# Patient Record
Sex: MALE | Race: WHITE | NOT HISPANIC OR LATINO
[De-identification: names, ages, dates, MRNs, and addresses within clinical notes are randomized per-mention and may not be internally consistent; named-entity substitution may affect disease eponyms.]

---

## 2018-12-29 ENCOUNTER — TRANSCRIPTION ENCOUNTER (OUTPATIENT)
Age: 63
End: 2018-12-29

## 2020-07-14 ENCOUNTER — TRANSCRIPTION ENCOUNTER (OUTPATIENT)
Age: 65
End: 2020-07-14

## 2020-07-14 ENCOUNTER — APPOINTMENT (OUTPATIENT)
Dept: UROLOGY | Facility: CLINIC | Age: 65
End: 2020-07-14
Payer: MEDICARE

## 2020-07-14 VITALS — TEMPERATURE: 97.2 F

## 2020-07-14 VITALS
BODY MASS INDEX: 29.35 KG/M2 | DIASTOLIC BLOOD PRESSURE: 81 MMHG | HEART RATE: 76 BPM | WEIGHT: 205 LBS | SYSTOLIC BLOOD PRESSURE: 132 MMHG | HEIGHT: 70 IN

## 2020-07-14 PROCEDURE — 99204 OFFICE O/P NEW MOD 45 MIN: CPT

## 2020-07-14 NOTE — PHYSICAL EXAM
[General Appearance - Well Developed] : well developed [Normal Appearance] : normal appearance [General Appearance - Well Nourished] : well nourished [General Appearance - In No Acute Distress] : no acute distress [Well Groomed] : well groomed [Edema] : no peripheral edema [Respiration, Rhythm And Depth] : normal respiratory rhythm and effort [Abdomen Soft] : soft [Exaggerated Use Of Accessory Muscles For Inspiration] : no accessory muscle use [Abdomen Tenderness] : non-tender [Costovertebral Angle Tenderness] : no ~M costovertebral angle tenderness [Urethral Meatus] : meatus normal [Penis Abnormality] : normal circumcised penis [Urinary Bladder Findings] : the bladder was normal on palpation [Epididymis] : the epididymides were normal [Scrotum] : the scrotum was normal [Testes Mass (___cm)] : there were no testicular masses [No Prostate Nodules] : no prostate nodules [Rectal Exam - Rectum] : digital rectal exam was normal [Prostate Size ___ (0-4)] : prostate size [unfilled] (scale: 0-4) [] : no rash [Normal Station and Gait] : the gait and station were normal for the patient's age [No Focal Deficits] : no focal deficits [Oriented To Time, Place, And Person] : oriented to person, place, and time [Affect] : the affect was normal [Mood] : the mood was normal [Not Anxious] : not anxious [No Palpable Adenopathy] : no palpable adenopathy

## 2020-07-14 NOTE — HISTORY OF PRESENT ILLNESS
[Nocturia] : nocturia [Hematuria - Microscopic] : microscopic hematuria [FreeTextEntry1] : 64 yo male presents to establish care for kidney stones. Pt had two episodes of kidney stones 10-15 yrs ago. Pt went to PCP 10 days ago, on UA RBC were seen, CT urogram was ordered, multiple stones on CT. Pt denies dysuria, gross hematuria, abd/flank pain. Pt states frequency with caffeine consumption. Nocturia x1, otherwise voiding well.\par \par Recent psa 0.62.  Recent u/a---> TNTC microscopic blood  (7/2020)\par \par Surgical hx: lumbar lami 2016, bone fusion 20 yrs ago\par Medical hx:asthma, HLD,\par Allergies: PCN- childhood, sulfa- asthma\par Family hx: Father- prostate ca, brother- cardiac \par Meds: singulair, lipitor, asa, vit D, zetia\par \par

## 2020-07-14 NOTE — LETTER BODY
[Dear  ___] : Dear  [unfilled], [Consult Letter:] : I had the pleasure of evaluating your patient, [unfilled]. [( Thank you for referring [unfilled] for consultation for _____ )] : Thank you for referring [unfilled] for consultation for [unfilled] [Please see my note below.] : Please see my note below. [Consult Closing:] : Thank you very much for allowing me to participate in the care of this patient.  If you have any questions, please do not hesitate to contact me. [Sincerely,] : Sincerely, [FreeTextEntry1] : 66 yo male presents to establish care for kidney stones. Pt had two episodes of kidney stones 10-15 yrs ago. Pt went to PCP 10 days ago, on UA RBC were seen, CT urogram was ordered, multiple stones on CT. Pt denies dysuria, gross hematuria, abd/flank pain. Pt states frequency with caffeine consumption. Nocturia x1, otherwise voiding well.\par \par Surgical hx: lumbar lami 2016, bone fusion 20 yrs ago\par Medical hx:asthma, HLD,\par Allergies: PCN- childhood, sulfa- asthma\par Family hx: Father- prostate ca, brother- cardiac \par Meds: singulair, lipitor, asa, vit D, zetia\par \par PE today unremarkable\par \par Microscopic hematuria asymptomatic in 66 yo; with stone history x 2 episodes passed, and with stones on CT urogram. No mass, no hydro. Multiple stones both kidneys, up to 6 mm right, 12 mm left lower. Additional small stones/calcifications, probable MSK based on striations on IVU reconstruct plus stone history.\par \par No surgical intervention required at this time.  We discussed ESWL, URS, PCNL given size of stone on left.\par \par 1. urine cytology and culture today\par 2. 24 hour urine collection x 2 in next few weeks\par 3. RTC for cystoscopy given stones not active, and to r/o any lower tract issue\par 4. Diet modification reviewed at length- increasing fluids (primarily water), citrus is good, and decreasing/moderating salt, animal flesh protein, oxalate containing foods, and moderation of calcium intake (1000 mg/day is USRDA).\par \par I reviewed with the patient the risks of metabolic stone disease given their underlying risk parameters (all of which include large stones, multiple stones, bilateral stones, family history, and young age), and the indications for 24 hour urine metabolic assessment.\par \par We also discussed benefits of regular exercise and weight loss as independent risk reducers for stones.

## 2020-07-14 NOTE — ASSESSMENT
[FreeTextEntry1] : Microscopic hematuria asymptomatic in 64 yo; with stone history x 2 episodes passed, and with stones on CT urogram. No mass, no hydro. Multiple stones both kidneys, up to 6 mm right, 12 mm left lower. Additional small stones/calcifications, probable MSK based on striations on IVU reconstruct plus stone history.\par \par No surgical intervention required at this time.  We discussed ESWL, URS, PCNL given size of stone on left.\par \par 1. urine cytology and culture today\par 2. 24 hour urine collection x 2 in next few weeks\par 3. RTC for cystoscopy given stones not active, and to r/o any lower tract issue\par 4. Diet modification reviewed at length- increasing fluids (primarily water), citrus is good, and decreasing/moderating salt, animal flesh protein, oxalate containing foods, and moderation of calcium intake (1000 mg/day is USRDA).\par \par I reviewed with the patient the risks of metabolic stone disease given their underlying risk parameters (all of which include large stones, multiple stones, bilateral stones, family history, and young age), and the indications for 24 hour urine metabolic assessment.\par \par We also discussed benefits of regular exercise and weight loss as independent risk reducers for stones.

## 2020-07-15 LAB
BACTERIA UR CULT: NORMAL
URINE CYTOLOGY: NORMAL

## 2020-08-07 ENCOUNTER — APPOINTMENT (OUTPATIENT)
Dept: UROLOGY | Facility: CLINIC | Age: 65
End: 2020-08-07

## 2020-08-10 ENCOUNTER — TRANSCRIPTION ENCOUNTER (OUTPATIENT)
Age: 65
End: 2020-08-10

## 2020-08-10 RX ORDER — DIAZEPAM 10 MG/1
10 TABLET ORAL ONCE
Qty: 1 | Refills: 0 | Status: ACTIVE | COMMUNITY
Start: 2020-08-10 | End: 1900-01-01

## 2020-08-11 ENCOUNTER — TRANSCRIPTION ENCOUNTER (OUTPATIENT)
Age: 65
End: 2020-08-11

## 2020-08-12 ENCOUNTER — APPOINTMENT (OUTPATIENT)
Dept: UROLOGY | Facility: CLINIC | Age: 65
End: 2020-08-12
Payer: MEDICARE

## 2020-08-12 ENCOUNTER — OUTPATIENT (OUTPATIENT)
Dept: OUTPATIENT SERVICES | Facility: HOSPITAL | Age: 65
LOS: 1 days | End: 2020-08-12
Payer: MEDICARE

## 2020-08-12 VITALS
SYSTOLIC BLOOD PRESSURE: 135 MMHG | RESPIRATION RATE: 16 BRPM | HEART RATE: 81 BPM | TEMPERATURE: 97.6 F | DIASTOLIC BLOOD PRESSURE: 80 MMHG

## 2020-08-12 VITALS — TEMPERATURE: 97.6 F

## 2020-08-12 DIAGNOSIS — R35.0 FREQUENCY OF MICTURITION: ICD-10-CM

## 2020-08-12 PROCEDURE — 52000 CYSTOURETHROSCOPY: CPT

## 2020-08-12 PROCEDURE — 99214 OFFICE O/P EST MOD 30 MIN: CPT | Mod: 25

## 2020-08-12 NOTE — PHYSICAL EXAM
[General Appearance - Well Developed] : well developed [Abdomen Soft] : soft [Skin Color & Pigmentation] : normal skin color and pigmentation [] : no respiratory distress [Heart Rate And Rhythm] : Heart rate and rhythm were normal [Oriented To Time, Place, And Person] : oriented to person, place, and time [Normal Station and Gait] : the gait and station were normal for the patient's age [No Focal Deficits] : no focal deficits

## 2020-08-13 NOTE — HISTORY OF PRESENT ILLNESS
[None] : None [FreeTextEntry1] : 65 yr old male presents to follow up with kidney stones and complete w/u for microhematuria. Pt denies frequency/urgency, dysuria, hematuria, or abd/ flank pain.

## 2020-08-13 NOTE — ASSESSMENT
[FreeTextEntry1] : Cysto today  see notes:\par \par This includes increasing fluid intake to produce 2 to 2.5 liters of urine per day (approx 3 liters intake), and should be primarily water.  \par \par Calcium intake should be approximately 1000 mg per day.  \par \par Oxalate intake should be reduced- most common sources in diet which have very high levels include peanuts/nuts, tea, coffee, chocolate, spinach, beets, rhubarb, swiss chard.  I've also given/directed to a list with other high oxalate.\par   \par Animal flesh protein should be controlled- approx 4 to 6 ounces per day, with some vegetarian days included in the week.  Studies have shown that vegetarians have half the risk of stones of people who eat only 4 ounces per day of meat or fish of any kind (beef, chicken, fish, shellfish, pork, etc), indicating that a vegetarian lifestyle can decrease future stone risks.\par \par Finally, salt intake should be reduced as high levels in the diet will increase urinary calcium.  <2400 mg per day on a low sodium diet is strongly recommended.\par \par Citrate is a benefit; remi and limes with most citrate and least sugar- recommend 'a lemon or lime a day'; easiest with concentrate, mixed into water or other beverages.\par \par Lifestyle changes: regular exercise and weight loss both are independent risk-reducers for stones.\par \par In addition, for further details online, go to www.[employer].com <http://www.[employer].com> ---> in the lower left column there is a link for "diet resources", and review or download.\par \par 24 hr urine- good volume, good sodium, high oxalate on one of the days. High animal proteins, high UA on 8/3\par \par Plan\par 1) RTC in 6 months with Renal US \par 2) diet mod

## 2020-08-17 DIAGNOSIS — Q61.5 MEDULLARY CYSTIC KIDNEY: ICD-10-CM

## 2020-08-17 DIAGNOSIS — N20.0 CALCULUS OF KIDNEY: ICD-10-CM

## 2020-08-17 DIAGNOSIS — R31.29 OTHER MICROSCOPIC HEMATURIA: ICD-10-CM

## 2020-11-13 ENCOUNTER — TRANSCRIPTION ENCOUNTER (OUTPATIENT)
Age: 65
End: 2020-11-13

## 2021-02-10 ENCOUNTER — APPOINTMENT (OUTPATIENT)
Dept: UROLOGY | Facility: CLINIC | Age: 66
End: 2021-02-10
Payer: MEDICARE

## 2021-02-10 ENCOUNTER — OUTPATIENT (OUTPATIENT)
Dept: OUTPATIENT SERVICES | Facility: HOSPITAL | Age: 66
LOS: 1 days | End: 2021-02-10
Payer: MEDICARE

## 2021-02-10 DIAGNOSIS — Q61.5 MEDULLARY CYSTIC KIDNEY: ICD-10-CM

## 2021-02-10 DIAGNOSIS — N20.0 CALCULUS OF KIDNEY: ICD-10-CM

## 2021-02-10 DIAGNOSIS — R31.29 OTHER MICROSCOPIC HEMATURIA: ICD-10-CM

## 2021-02-10 DIAGNOSIS — R35.0 FREQUENCY OF MICTURITION: ICD-10-CM

## 2021-02-10 PROCEDURE — 76775 US EXAM ABDO BACK WALL LIM: CPT | Mod: 26

## 2021-02-10 PROCEDURE — 99214 OFFICE O/P EST MOD 30 MIN: CPT | Mod: 25

## 2021-02-10 PROCEDURE — 76775 US EXAM ABDO BACK WALL LIM: CPT

## 2021-02-10 RX ORDER — POTASSIUM CITRATE 10 MEQ/1
10 MEQ TABLET, EXTENDED RELEASE ORAL
Qty: 180 | Refills: 3 | Status: ACTIVE | COMMUNITY
Start: 2021-02-10 | End: 1900-01-01

## 2021-02-10 NOTE — HISTORY OF PRESENT ILLNESS
[None] : None [FreeTextEntry1] : 65 yr old male presents to follow up with kidney stones, microhematuria, s/p w/u 2020. Pt denies dysuria, hematuria, or abd/ flank pain. \par \par Likely MSK. No new sig issues to report.   [Dysuria] : no dysuria [Hematuria - Gross] : no gross hematuria

## 2021-02-10 NOTE — ASSESSMENT
[FreeTextEntry1] : Renal US reviewed- no sig change from prior stone burden as visualized on CT urogram- approx 8 mm right mid-kidney with shadowing. Several stones 3 to 5 mm left kidney with twinkle, suggestion of shadowing on largest. No hydro. No solid renal mass.\par \par We had discussion about MSK, stone prevention, and reviewed earlier 24 hour urine collection.\par \par Benefits for potassium citrate for stone prevention and for reduction of risks of stone episodes in setting of clinical MSK.  Pt agreeable- \par \par 1. pot citrate 10 meq bid (either tab, or via litholyte\par 2. RTC 6 months with renal US for f/u

## 2021-02-12 ENCOUNTER — TRANSCRIPTION ENCOUNTER (OUTPATIENT)
Age: 66
End: 2021-02-12

## 2021-02-16 DIAGNOSIS — Q61.5 MEDULLARY CYSTIC KIDNEY: ICD-10-CM

## 2021-02-16 DIAGNOSIS — N20.0 CALCULUS OF KIDNEY: ICD-10-CM

## 2021-02-16 DIAGNOSIS — R31.29 OTHER MICROSCOPIC HEMATURIA: ICD-10-CM

## 2021-02-24 ENCOUNTER — NON-APPOINTMENT (OUTPATIENT)
Age: 66
End: 2021-02-24

## 2021-02-25 ENCOUNTER — TRANSCRIPTION ENCOUNTER (OUTPATIENT)
Age: 66
End: 2021-02-25

## 2021-02-25 ENCOUNTER — NON-APPOINTMENT (OUTPATIENT)
Age: 66
End: 2021-02-25

## 2021-02-26 ENCOUNTER — NON-APPOINTMENT (OUTPATIENT)
Age: 66
End: 2021-02-26

## 2021-02-26 ENCOUNTER — INPATIENT (INPATIENT)
Facility: HOSPITAL | Age: 66
LOS: 0 days | Discharge: HOME | DRG: 694 | End: 2021-02-27
Attending: UROLOGY | Admitting: UROLOGY
Payer: COMMERCIAL

## 2021-02-26 ENCOUNTER — TRANSCRIPTION ENCOUNTER (OUTPATIENT)
Age: 66
End: 2021-02-26

## 2021-02-26 VITALS
SYSTOLIC BLOOD PRESSURE: 188 MMHG | RESPIRATION RATE: 18 BRPM | HEIGHT: 71 IN | WEIGHT: 199.96 LBS | TEMPERATURE: 99 F | HEART RATE: 112 BPM | OXYGEN SATURATION: 96 % | DIASTOLIC BLOOD PRESSURE: 91 MMHG

## 2021-02-26 LAB
ALBUMIN SERPL ELPH-MCNC: 4.2 G/DL — SIGNIFICANT CHANGE UP (ref 3.3–5)
ALP SERPL-CCNC: 37 U/L — LOW (ref 40–120)
ALT FLD-CCNC: 27 U/L — SIGNIFICANT CHANGE UP (ref 10–45)
ANION GAP SERPL CALC-SCNC: 12 MMOL/L — SIGNIFICANT CHANGE UP (ref 5–17)
APPEARANCE UR: CLEAR — SIGNIFICANT CHANGE UP
AST SERPL-CCNC: 24 U/L — SIGNIFICANT CHANGE UP (ref 10–40)
BACTERIA # UR AUTO: NEGATIVE — SIGNIFICANT CHANGE UP
BASOPHILS # BLD AUTO: 0.03 K/UL — SIGNIFICANT CHANGE UP (ref 0–0.2)
BASOPHILS NFR BLD AUTO: 0.3 % — SIGNIFICANT CHANGE UP (ref 0–2)
BILIRUB SERPL-MCNC: 1 MG/DL — SIGNIFICANT CHANGE UP (ref 0.2–1.2)
BILIRUB UR-MCNC: NEGATIVE — SIGNIFICANT CHANGE UP
BUN SERPL-MCNC: 20 MG/DL — SIGNIFICANT CHANGE UP (ref 7–23)
CALCIUM SERPL-MCNC: 9 MG/DL — SIGNIFICANT CHANGE UP (ref 8.4–10.5)
CHLORIDE SERPL-SCNC: 103 MMOL/L — SIGNIFICANT CHANGE UP (ref 96–108)
CO2 SERPL-SCNC: 25 MMOL/L — SIGNIFICANT CHANGE UP (ref 22–31)
COLOR SPEC: YELLOW — SIGNIFICANT CHANGE UP
CREAT SERPL-MCNC: 1.26 MG/DL — SIGNIFICANT CHANGE UP (ref 0.5–1.3)
DIFF PNL FLD: ABNORMAL
EOSINOPHIL # BLD AUTO: 0.03 K/UL — SIGNIFICANT CHANGE UP (ref 0–0.5)
EOSINOPHIL NFR BLD AUTO: 0.3 % — SIGNIFICANT CHANGE UP (ref 0–6)
EPI CELLS # UR: 1 /HPF — SIGNIFICANT CHANGE UP
GLUCOSE SERPL-MCNC: 108 MG/DL — HIGH (ref 70–99)
GLUCOSE UR QL: NEGATIVE — SIGNIFICANT CHANGE UP
HCT VFR BLD CALC: 43.2 % — SIGNIFICANT CHANGE UP (ref 39–50)
HGB BLD-MCNC: 14 G/DL — SIGNIFICANT CHANGE UP (ref 13–17)
HYALINE CASTS # UR AUTO: 0 /LPF — SIGNIFICANT CHANGE UP (ref 0–7)
IMM GRANULOCYTES NFR BLD AUTO: 0.5 % — SIGNIFICANT CHANGE UP (ref 0–1.5)
KETONES UR-MCNC: ABNORMAL
LEUKOCYTE ESTERASE UR-ACNC: NEGATIVE — SIGNIFICANT CHANGE UP
LYMPHOCYTES # BLD AUTO: 1.27 K/UL — SIGNIFICANT CHANGE UP (ref 1–3.3)
LYMPHOCYTES # BLD AUTO: 12.7 % — LOW (ref 13–44)
MCHC RBC-ENTMCNC: 30.3 PG — SIGNIFICANT CHANGE UP (ref 27–34)
MCHC RBC-ENTMCNC: 32.4 GM/DL — SIGNIFICANT CHANGE UP (ref 32–36)
MCV RBC AUTO: 93.5 FL — SIGNIFICANT CHANGE UP (ref 80–100)
MONOCYTES # BLD AUTO: 0.86 K/UL — SIGNIFICANT CHANGE UP (ref 0–0.9)
MONOCYTES NFR BLD AUTO: 8.6 % — SIGNIFICANT CHANGE UP (ref 2–14)
NEUTROPHILS # BLD AUTO: 7.74 K/UL — HIGH (ref 1.8–7.4)
NEUTROPHILS NFR BLD AUTO: 77.6 % — HIGH (ref 43–77)
NITRITE UR-MCNC: NEGATIVE — SIGNIFICANT CHANGE UP
NRBC # BLD: 0 /100 WBCS — SIGNIFICANT CHANGE UP (ref 0–0)
PH UR: 6 — SIGNIFICANT CHANGE UP (ref 5–8)
PLATELET # BLD AUTO: 164 K/UL — SIGNIFICANT CHANGE UP (ref 150–400)
POTASSIUM SERPL-MCNC: 4.3 MMOL/L — SIGNIFICANT CHANGE UP (ref 3.5–5.3)
POTASSIUM SERPL-SCNC: 4.3 MMOL/L — SIGNIFICANT CHANGE UP (ref 3.5–5.3)
PROT SERPL-MCNC: 7 G/DL — SIGNIFICANT CHANGE UP (ref 6–8.3)
PROT UR-MCNC: SIGNIFICANT CHANGE UP
RBC # BLD: 4.62 M/UL — SIGNIFICANT CHANGE UP (ref 4.2–5.8)
RBC # FLD: 12.2 % — SIGNIFICANT CHANGE UP (ref 10.3–14.5)
RBC CASTS # UR COMP ASSIST: 1 /HPF — SIGNIFICANT CHANGE UP (ref 0–4)
SARS-COV-2 RNA SPEC QL NAA+PROBE: SIGNIFICANT CHANGE UP
SODIUM SERPL-SCNC: 140 MMOL/L — SIGNIFICANT CHANGE UP (ref 135–145)
SP GR SPEC: 1.02 — SIGNIFICANT CHANGE UP (ref 1.01–1.02)
UROBILINOGEN FLD QL: NEGATIVE — SIGNIFICANT CHANGE UP
WBC # BLD: 9.98 K/UL — SIGNIFICANT CHANGE UP (ref 3.8–10.5)
WBC # FLD AUTO: 9.98 K/UL — SIGNIFICANT CHANGE UP (ref 3.8–10.5)
WBC UR QL: 3 /HPF — SIGNIFICANT CHANGE UP (ref 0–5)

## 2021-02-26 RX ORDER — ATORVASTATIN CALCIUM 80 MG/1
40 TABLET, FILM COATED ORAL AT BEDTIME
Refills: 0 | Status: DISCONTINUED | OUTPATIENT
Start: 2021-02-26 | End: 2021-02-26

## 2021-02-26 RX ORDER — ACETAMINOPHEN 500 MG
975 TABLET ORAL EVERY 6 HOURS
Refills: 0 | Status: DISCONTINUED | OUTPATIENT
Start: 2021-02-26 | End: 2021-02-27

## 2021-02-26 RX ORDER — SENNA PLUS 8.6 MG/1
2 TABLET ORAL AT BEDTIME
Refills: 0 | Status: DISCONTINUED | OUTPATIENT
Start: 2021-02-26 | End: 2021-02-27

## 2021-02-26 RX ORDER — KETOROLAC TROMETHAMINE 30 MG/ML
15 SYRINGE (ML) INJECTION EVERY 8 HOURS
Refills: 0 | Status: DISCONTINUED | OUTPATIENT
Start: 2021-02-26 | End: 2021-02-27

## 2021-02-26 RX ORDER — MORPHINE SULFATE 50 MG/1
4 CAPSULE, EXTENDED RELEASE ORAL EVERY 6 HOURS
Refills: 0 | Status: DISCONTINUED | OUTPATIENT
Start: 2021-02-26 | End: 2021-02-27

## 2021-02-26 RX ORDER — HYDROMORPHONE HYDROCHLORIDE 2 MG/ML
1 INJECTION INTRAMUSCULAR; INTRAVENOUS; SUBCUTANEOUS EVERY 8 HOURS
Refills: 0 | Status: DISCONTINUED | OUTPATIENT
Start: 2021-02-26 | End: 2021-02-27

## 2021-02-26 RX ORDER — ONDANSETRON 8 MG/1
4 TABLET, FILM COATED ORAL EVERY 6 HOURS
Refills: 0 | Status: DISCONTINUED | OUTPATIENT
Start: 2021-02-26 | End: 2021-02-27

## 2021-02-26 RX ORDER — OXYCODONE HYDROCHLORIDE 5 MG/1
5 TABLET ORAL EVERY 6 HOURS
Refills: 0 | Status: DISCONTINUED | OUTPATIENT
Start: 2021-02-26 | End: 2021-02-27

## 2021-02-26 RX ORDER — ONDANSETRON 8 MG/1
4 TABLET, FILM COATED ORAL ONCE
Refills: 0 | Status: COMPLETED | OUTPATIENT
Start: 2021-02-26 | End: 2021-02-26

## 2021-02-26 RX ORDER — SODIUM CHLORIDE 9 MG/ML
3 INJECTION INTRAMUSCULAR; INTRAVENOUS; SUBCUTANEOUS EVERY 8 HOURS
Refills: 0 | Status: DISCONTINUED | OUTPATIENT
Start: 2021-02-26 | End: 2021-02-27

## 2021-02-26 RX ORDER — SODIUM CHLORIDE 9 MG/ML
1000 INJECTION INTRAMUSCULAR; INTRAVENOUS; SUBCUTANEOUS ONCE
Refills: 0 | Status: COMPLETED | OUTPATIENT
Start: 2021-02-26 | End: 2021-02-26

## 2021-02-26 RX ORDER — MULTIVIT WITH MIN/MFOLATE/K2 340-15/3 G
1 POWDER (GRAM) ORAL ONCE
Refills: 0 | Status: COMPLETED | OUTPATIENT
Start: 2021-02-26 | End: 2021-02-26

## 2021-02-26 RX ORDER — POLYETHYLENE GLYCOL 3350 17 G/17G
17 POWDER, FOR SOLUTION ORAL DAILY
Refills: 0 | Status: DISCONTINUED | OUTPATIENT
Start: 2021-02-26 | End: 2021-02-27

## 2021-02-26 RX ORDER — KETOROLAC TROMETHAMINE 30 MG/ML
15 SYRINGE (ML) INJECTION ONCE
Refills: 0 | Status: DISCONTINUED | OUTPATIENT
Start: 2021-02-26 | End: 2021-02-26

## 2021-02-26 RX ORDER — SODIUM CHLORIDE 9 MG/ML
1000 INJECTION INTRAMUSCULAR; INTRAVENOUS; SUBCUTANEOUS
Refills: 0 | Status: DISCONTINUED | OUTPATIENT
Start: 2021-02-26 | End: 2021-02-27

## 2021-02-26 RX ORDER — TAMSULOSIN HYDROCHLORIDE 0.4 MG/1
0.4 CAPSULE ORAL AT BEDTIME
Refills: 0 | Status: DISCONTINUED | OUTPATIENT
Start: 2021-02-26 | End: 2021-02-27

## 2021-02-26 RX ADMIN — SODIUM CHLORIDE 1000 MILLILITER(S): 9 INJECTION INTRAMUSCULAR; INTRAVENOUS; SUBCUTANEOUS at 13:03

## 2021-02-26 RX ADMIN — SODIUM CHLORIDE 125 MILLILITER(S): 9 INJECTION INTRAMUSCULAR; INTRAVENOUS; SUBCUTANEOUS at 19:25

## 2021-02-26 RX ADMIN — POLYETHYLENE GLYCOL 3350 17 GRAM(S): 17 POWDER, FOR SOLUTION ORAL at 18:10

## 2021-02-26 RX ADMIN — ONDANSETRON 4 MILLIGRAM(S): 8 TABLET, FILM COATED ORAL at 13:03

## 2021-02-26 RX ADMIN — SENNA PLUS 2 TABLET(S): 8.6 TABLET ORAL at 19:51

## 2021-02-26 RX ADMIN — Medication 1 BOTTLE: at 15:31

## 2021-02-26 RX ADMIN — Medication 15 MILLIGRAM(S): at 13:03

## 2021-02-26 RX ADMIN — SODIUM CHLORIDE 3 MILLILITER(S): 9 INJECTION INTRAMUSCULAR; INTRAVENOUS; SUBCUTANEOUS at 22:00

## 2021-02-26 RX ADMIN — Medication 15 MILLIGRAM(S): at 21:49

## 2021-02-26 RX ADMIN — TAMSULOSIN HYDROCHLORIDE 0.4 MILLIGRAM(S): 0.4 CAPSULE ORAL at 19:51

## 2021-02-26 NOTE — ED CDU PROVIDER DISPOSITION NOTE - CARE PROVIDER_API CALL
Hoenig, David M (MD)  Urology  [facility]- Dept. of Urology, [address] [place] [address]  Phone: [phone]  Fax: [phone]  Follow Up Time: 1-3 Days

## 2021-02-26 NOTE — ED CDU PROVIDER DISPOSITION NOTE - CLINICAL COURSE
64yo male with PMHx of HLD, Kidney stonex2 (last one was 7years ago) presents to ED with right flank pain and diagnosed right mid ureter stone (5mm). Pt stated he felt sudden sharp right flank pain 2days ago and evaluated in ED at [facility]. He is on Percocet/ Ibuprofen but noticed worsening pain today. He spoked to his urologist Dr. Hoenig and recommended to go to ED. Denies fever, cough, congestion or sore throat, CP, SOB, urinary problems. Denies sensory changes or weakness to extremities.  In ED, pt initially tachycardic, now improved. Blood work unremarkable. UA + mod blood. Pt seen by urology who reviewed outside imaging, recommending CDU for pain control, hydration, strain urine, flomax. Repeat labs in AM...... 64yo male with PMHx of HLD, Kidney stonex2 (last one was 7years ago) presents to ED with right flank pain and diagnosed right mid ureter stone (5mm). Pt stated he felt sudden sharp right flank pain 2days ago and evaluated in ED at [facility]. He is on Percocet/ Ibuprofen but noticed worsening pain today. He spoked to his urologist Dr. Hoenig and recommended to go to ED. Denies fever, cough, congestion or sore throat, CP, SOB, urinary problems. Denies sensory changes or weakness to extremities.  In ED, pt initially tachycardic, now improved. Blood work unremarkable. UA + mod blood. Pt seen by urology who reviewed outside imaging, recommending CDU for pain control, hydration, strain urine, flomax.  Per Urology, pt can do PO challenge and be discharge with PO meds with follow up with Dr. Hoenig in office this week. Pt moaning in pain after speaking to Urology 8/10 pain, unable to find a comfortable position. Urology repaged- pt to be admitted for further management. NPO. Case discussed with Dr. Joy.

## 2021-02-26 NOTE — ED CDU PROVIDER DISPOSITION NOTE - ATTENDING CONTRIBUTION TO CARE
Attending MD Joy:   I personally have seen and examined this patient.  Physician assistant note reviewed and agree on plan of care and except where noted.  See below for details.     Seen in [place] 61    65M with PMH/PSH including HLD, nephrolithiasis (last one 7 yrs ago) presents to the ED with R flank pain and known nephrolithiasis.  Reports persistent R flank pain.  Denies fevers.  Denies chest pain, shortness of breath, palpitations. Denies dysuria, hematuria, change in urinary habits including frequency, urgency.  Denies numbness, weakness or tingling in extremities. Denies rash. Reports sexually active, one female partner, denies STIs.  Denies testicular pain, penile discharge or lesions.  A ten (10) point review of systems was negative other than as stated in the HPI or elsewhere in the chart.     Exam:   General: NAD  HENT: head NCAT, airway patent with moist mucous membranes  Eyes: PERRL  Lungs: lungs CTAB with good inspiratory effort, no wheezing, no rhonchi, no rales  Cardiac: +S1S2, no m/r/g  GI: abdomen soft with +BS, NT, ND  : R CVAT  MSK: FROM at neck, no calf tenderness, swelling, erythema or warmth  Neuro: moving all extremities spontaneously, sensory grossly intact, no gross neuro deficits  Psych: normal mood and affect     A/P: 65M with R nephrolithiasis, pain poorly controlled, Cr, uro will admit patient.

## 2021-02-26 NOTE — ED ADULT NURSE NOTE - OBJECTIVE STATEMENT
64 y/o male PMH of HLD, Kidney stones with last stone 7 years ago presenting to ED c/o worsening, uncontrolled R. sided flank pain with a dx r. ureter stone  a few days ago while seen in another ED. pt states he was prescribed percocet for the pain but called his urologist today c/o the pain being unrelieved by meds and was directed to come to the ED for pain management and further eval. pt denies any noted blood in urine, no painful urination, fever, chills, weakness, dizziness, abd pain, diarrhea, known sick contacts or recent travel. verbalizes feeling nauseas that is associated with the pain- no vomiting. VS stable. labs and line obtained, results pending. safety maintained, call bell at the bedside and within reach.

## 2021-02-26 NOTE — ED PROVIDER NOTE - PHYSICAL EXAMINATION
NAD, Tachycardia, Hypertensive, Afebrile, Lungs clear. ABD soft, non tender. No CVA tender. Neuro- intact. No peripheral edema.

## 2021-02-26 NOTE — ED ADULT NURSE REASSESSMENT NOTE - NS ED NURSE REASSESS COMMENT FT1
Received pt from GEOVANNA Sorto , received pt alert and responsive, oriented x4, denies any respiratory distress, SOB, or difficulty breathing. Pt transferred to CDU for R flank pain. Pt resting comfortably at this time with no complaints. IV in place, patent and free of signs of infiltration, V/S stable, pt afebrile. Pt educated on unit and unit rules, instructed patient to notify RN of any needed assistance, Pt verbalizes understanding, Call bell placed within reach. Safety maintained. Will continue to monitor. Pending AM labs. Received pt from GEOVANNA Sorto , received pt alert and responsive, oriented x4, denies any respiratory distress, SOB, or difficulty breathing. Pt transferred to CDU for R flank pain. Pt denies flank pain at this time but has c/o abdominal "bloating", constipation. Placed on IVF tolerating well. IV in place, patent and free of signs of infiltration, V/S stable, pt afebrile. Pt educated on unit and unit rules, instructed patient to notify RN of any needed assistance, Pt verbalizes understanding, Call bell placed within reach. Safety maintained. Will continue to monitor. Pending AM labs.

## 2021-02-26 NOTE — ED CDU PROVIDER INITIAL DAY NOTE - DETAILS
66yo male pt with PMHx of HLD, Kidney stonex2 (last one was 7years ago) presents to ED with right flank pain and diagnosed right mid ureter stone (5mm).  *KIDNEY STONE  -pain control  -IVF  -anti-emetics  -appreciate urology consult  -discussed case with Dr. Joy

## 2021-02-26 NOTE — CHART NOTE - NSCHARTNOTEFT_GEN_A_CORE
Pt seen and examined at bedside.  Follow up pain control, labs.  No CT necessary as he brought disc.

## 2021-02-26 NOTE — CONSULT NOTE ADULT - ASSESSMENT
R renal colic secondary to 5mm R mid ureteral stone  - Pain control, i.e. Toradol, Percocet  - Send urine culture  - Strain urine  - Hydration  - Flomax 0.4mg QHS  - Will observe in CDU overnight  - D/w Dr. Hoenig

## 2021-02-26 NOTE — ED ADULT NURSE REASSESSMENT NOTE - GENERAL PATIENT STATE
comfortable appearance/cooperative/resting/sleeping/smiling/interactive comfortable appearance/cooperative/improvement verbalized/resting/sleeping/smiling/interactive

## 2021-02-26 NOTE — ED ADULT NURSE REASSESSMENT NOTE - NS ED NURSE REASSESS COMMENT FT1
Pt report received from Harika AUSTIN. Pt transferred to cdu Bed 2 for pain control due to kidney stone. Pt a/ox3 denies respiratory distress, sob, dyspnea, C/P, palpitations, n/v/d at this time. Pt states symptoms have improved.  VSS, afebrile, IV clean/dry/intact. Pt aware of plan of care, call bell within reach ,safety maintained. Will monitor and assess.

## 2021-02-26 NOTE — ED CDU PROVIDER DISPOSITION NOTE - CARE PROVIDERS DIRECT ADDRESSES
[email or web address]

## 2021-02-26 NOTE — ED CDU PROVIDER INITIAL DAY NOTE - OBJECTIVE STATEMENT
64yo male pt with PMHx of HLD, Kidney stonex2 (last one was 7years ago) presents to ED with right flank pain and diagnosed right mid ureter stone (5mm). Pt stated he felt sudden sharp right flank pain 2days ago and evaluated in ED at [facility]. He is on Percocet/ Ibuprofen but noticed worsening pain today. He spoked to his urologist Dr. Hoenig and recommended to go to ED. He noticed radiating pain to lower abdomen, nausea and chills with severe pain and the pain is mild after taking Percocet at 11am (took Flomax last night and Ibuprofen at 6am). Denies fever, cough, congestion or sore throat. Denies CP/SOB. Denies urinary problems. Denies sensory changes or weakness to extremities..

## 2021-02-26 NOTE — ED CDU PROVIDER INITIAL DAY NOTE - ATTENDING CONTRIBUTION TO CARE
Attending MD Joy:   I personally have seen and examined this patient.  Physician assistant note reviewed and agree on plan of care and except where noted.  See below for details.     Seen in [place] 61    65M with PMH/PSH including HLD, nephrolithiasis (last one 7 yrs ago) presents to the ED with R flank pain and known nephrolithiasis.  Reports that about 48 hrs ago developed pain.  Went to [facility] ([place]) reports had CT scan which showed 5mm R mid ureter stone, reports pain control and discharge with Percocet.  Reports called Dr. Hoenig's office who recommended ED today.  Reports pain in the R flank extending anteriorly, associated with nausea and chills when pain is severe.  Reports also noted elevated BP at time of arrival to [facility].  Reports took Percocet at 9:45am, reports took Flomax and Ibuprofen last 4am.  Denies fevers.  Denies chest pain, shortness of breath, palpitations. Denies dysuria, hematuria, change in urinary habits including frequency, urgency.  Denies numbness, weakness or tingling in extremities. Denies rash. Reports sexually active, one female partner, denies STIs.  Denies testicular pain, penile discharge or lesions.  A ten (10) point review of systems was negative other than as stated in the HPI or elsewhere in the chart.     Exam:   General: NAD  HENT: head NCAT, airway patent with moist mucous membranes  Eyes: PERRL  Lungs: lungs CTAB with good inspiratory effort, no wheezing, no rhonchi, no rales  Cardiac: +S1S2, no m/r/g  GI: abdomen soft with +BS, NT, ND  : R CVAT  MSK: FROM at neck, no calf tenderness, swelling, erythema or warmth  Neuro: moving all extremities spontaneously, sensory grossly intact, no gross neuro deficits  Psych: normal mood and affect     A/P: 65M with R nephrolithiasis, will give pain control, CDU for pain control, IVFs, frequent reassessments, uro final recs

## 2021-02-26 NOTE — ED CDU PROVIDER DISPOSITION NOTE - NSFOLLOWUPINSTRUCTIONS_ED_ALL_ED_FT
Rest. Stay well hydrated. Continue to strain your urine.   Take Flomax 0.4mg daily.   Take Ibuprofen 600mg every 8 hrs for pain as needed. Take with food.   May alternate with Acetminophen (Tylenol) 650mg every 6 hours for pain as needed.     Take zofran 4mg tabs once every 8 hours as needed for nausea.   Take oxycodone 5mg every 8 hours as needed for severe pain *** caution SIDE EFFECT of drowsiness - DO NOT drive or drink alcohol while taking this medication.     Follow up with your Urologist, Dr. Hoenig, within 24-48 hours.  Please also follow up with your primary care provider. Bring copy of printed results with you.     Return to ER for any worsening pain, fever/chills, difficulty urinating, vomiting, unable to eat/drink, or any other concerning symptoms.

## 2021-02-26 NOTE — ED CDU PROVIDER INITIAL DAY NOTE - PROGRESS NOTE DETAILS
Pt seen and evaluated at bedside, resting comfortably, NAD. Tolerated dinner with no issues. C/o persistent constipation but recently took miralax and mag citrate. Denies any pain at this time. Most recent VSS. Pt with return of pain, reports 5/10. No n/v, no f/c. Most recent VSS. Pt given toradol, will re-assess.

## 2021-02-26 NOTE — ED PROVIDER NOTE - ATTENDING CONTRIBUTION TO CARE
Attending MD Joy: I personally have seen and examined this patient.  NP note reviewed and agree on plan of care and except where noted.  See below for details.     Seen in [place] 61    65M with PMH/PSH including HLD, nephrolithiasis (last one 7 yrs ago) presents to the ED with R flank pain and known nephrolithiasis.  Reports that about 48 hrs ago developed pain.  Went to [facility] ([place]) reports had CT scan which showed 5mm R mid ureter stone, reports pain control and discharge with Percocet.  Reports called Dr. Hoenig's office who recommended ED today.  Reports pain in the R flank extending anteriorly, associated with nausea and chills when pain is severe.  Reports also noted elevated BP at time of arrival to [facility].  Reports took Percocet at 9:45am, reports took Flomax and Ibuprofen last 4am.  Denies fevers.  Denies chest pain, shortness of breath, palpitations. Denies dysuria, hematuria, change in urinary habits including frequency, urgency.  Denies numbness, weakness or tingling in extremities. Denies rash. Reports sexually active, one female partner, denies STIs.  Denies testicular pain, penile discharge or lesions.  A ten (10) point review of systems was negative other than as stated in the HPI or elsewhere in the chart.     Exam:   General: NAD  HENT: head NCAT, airway patent with moist mucous membranes  Eyes: PERRL  Lungs: lungs CTAB with good inspiratory effort, no wheezing, no rhonchi, no rales  Cardiac: +S1S2, no m/r/g  GI: abdomen soft with +BS, NT, ND  : R CVAT  MSK: FROM at neck, no calf tenderness, swelling, erythema or warmth  Neuro: moving all extremities spontaneously, sensory grossly intact, no gross neuro deficits  Psych: normal mood and affect     A/P: 65M with R nephrolithiasis, will give pain control, obtain labs for renal function and WBC, UA for eval for infection, UrCx, possible CDU for obs

## 2021-02-27 ENCOUNTER — TRANSCRIPTION ENCOUNTER (OUTPATIENT)
Age: 66
End: 2021-02-27

## 2021-02-27 VITALS
OXYGEN SATURATION: 99 % | HEART RATE: 69 BPM | RESPIRATION RATE: 18 BRPM | SYSTOLIC BLOOD PRESSURE: 150 MMHG | TEMPERATURE: 98 F | DIASTOLIC BLOOD PRESSURE: 86 MMHG

## 2021-02-27 DIAGNOSIS — N23 UNSPECIFIED RENAL COLIC: ICD-10-CM

## 2021-02-27 LAB
ANION GAP SERPL CALC-SCNC: 7 MMOL/L — SIGNIFICANT CHANGE UP (ref 5–17)
BUN SERPL-MCNC: 23 MG/DL — SIGNIFICANT CHANGE UP (ref 7–23)
CALCIUM SERPL-MCNC: 8.4 MG/DL — SIGNIFICANT CHANGE UP (ref 8.4–10.5)
CHLORIDE SERPL-SCNC: 106 MMOL/L — SIGNIFICANT CHANGE UP (ref 96–108)
CO2 SERPL-SCNC: 26 MMOL/L — SIGNIFICANT CHANGE UP (ref 22–31)
CREAT SERPL-MCNC: 1.59 MG/DL — HIGH (ref 0.5–1.3)
CULTURE RESULTS: NO GROWTH — SIGNIFICANT CHANGE UP
GLUCOSE SERPL-MCNC: 106 MG/DL — HIGH (ref 70–99)
POTASSIUM SERPL-MCNC: 4.4 MMOL/L — SIGNIFICANT CHANGE UP (ref 3.5–5.3)
POTASSIUM SERPL-SCNC: 4.4 MMOL/L — SIGNIFICANT CHANGE UP (ref 3.5–5.3)
SODIUM SERPL-SCNC: 139 MMOL/L — SIGNIFICANT CHANGE UP (ref 135–145)
SPECIMEN SOURCE: SIGNIFICANT CHANGE UP

## 2021-02-27 RX ORDER — SODIUM CHLORIDE 9 MG/ML
1000 INJECTION, SOLUTION INTRAVENOUS
Refills: 0 | Status: DISCONTINUED | OUTPATIENT
Start: 2021-02-27 | End: 2021-02-27

## 2021-02-27 RX ORDER — ACETAMINOPHEN 500 MG
3 TABLET ORAL
Qty: 0 | Refills: 0 | DISCHARGE
Start: 2021-02-27

## 2021-02-27 RX ORDER — HEPARIN SODIUM 5000 [USP'U]/ML
5000 INJECTION INTRAVENOUS; SUBCUTANEOUS EVERY 8 HOURS
Refills: 0 | Status: DISCONTINUED | OUTPATIENT
Start: 2021-02-27 | End: 2021-02-27

## 2021-02-27 RX ORDER — CIPROFLOXACIN LACTATE 400MG/40ML
400 VIAL (ML) INTRAVENOUS EVERY 12 HOURS
Refills: 0 | Status: DISCONTINUED | OUTPATIENT
Start: 2021-02-27 | End: 2021-02-27

## 2021-02-27 RX ORDER — OXYCODONE HYDROCHLORIDE 5 MG/1
1 TABLET ORAL
Qty: 16 | Refills: 0
Start: 2021-02-27 | End: 2021-03-02

## 2021-02-27 RX ORDER — TAMSULOSIN HYDROCHLORIDE 0.4 MG/1
1 CAPSULE ORAL
Qty: 14 | Refills: 0
Start: 2021-02-27 | End: 2021-03-12

## 2021-02-27 RX ORDER — SENNA PLUS 8.6 MG/1
2 TABLET ORAL
Qty: 0 | Refills: 0 | DISCHARGE
Start: 2021-02-27

## 2021-02-27 RX ADMIN — SODIUM CHLORIDE 1000 MILLILITER(S): 9 INJECTION INTRAMUSCULAR; INTRAVENOUS; SUBCUTANEOUS at 03:00

## 2021-02-27 RX ADMIN — SODIUM CHLORIDE 3 MILLILITER(S): 9 INJECTION INTRAMUSCULAR; INTRAVENOUS; SUBCUTANEOUS at 06:23

## 2021-02-27 RX ADMIN — Medication 975 MILLIGRAM(S): at 10:55

## 2021-02-27 RX ADMIN — Medication 975 MILLIGRAM(S): at 00:44

## 2021-02-27 RX ADMIN — SODIUM CHLORIDE 3 MILLILITER(S): 9 INJECTION INTRAMUSCULAR; INTRAVENOUS; SUBCUTANEOUS at 12:45

## 2021-02-27 RX ADMIN — OXYCODONE HYDROCHLORIDE 5 MILLIGRAM(S): 5 TABLET ORAL at 12:31

## 2021-02-27 RX ADMIN — SODIUM CHLORIDE 125 MILLILITER(S): 9 INJECTION INTRAMUSCULAR; INTRAVENOUS; SUBCUTANEOUS at 11:33

## 2021-02-27 RX ADMIN — OXYCODONE HYDROCHLORIDE 5 MILLIGRAM(S): 5 TABLET ORAL at 04:47

## 2021-02-27 RX ADMIN — SODIUM CHLORIDE 125 MILLILITER(S): 9 INJECTION INTRAMUSCULAR; INTRAVENOUS; SUBCUTANEOUS at 03:08

## 2021-02-27 RX ADMIN — ONDANSETRON 4 MILLIGRAM(S): 8 TABLET, FILM COATED ORAL at 00:47

## 2021-02-27 RX ADMIN — HYDROMORPHONE HYDROCHLORIDE 1 MILLIGRAM(S): 2 INJECTION INTRAMUSCULAR; INTRAVENOUS; SUBCUTANEOUS at 11:33

## 2021-02-27 RX ADMIN — MORPHINE SULFATE 4 MILLIGRAM(S): 50 CAPSULE, EXTENDED RELEASE ORAL at 00:06

## 2021-02-27 RX ADMIN — POLYETHYLENE GLYCOL 3350 17 GRAM(S): 17 POWDER, FOR SOLUTION ORAL at 13:10

## 2021-02-27 NOTE — DISCHARGE NOTE PROVIDER - NSDCACTIVITY_GEN_ALL_CORE
Bathing allowed/Do not drive or operate machinery/Showering allowed/Do not make important decisions/Stairs allowed/Walking - Indoors allowed/Walking - Outdoors allowed

## 2021-02-27 NOTE — DISCHARGE NOTE PROVIDER - NSDCMRMEDTOKEN_GEN_ALL_CORE_FT
acetaminophen 325 mg oral tablet: 3 tab(s) orally every 6 hours, As needed, Mild Pain (1 - 3)  oxyCODONE 5 mg oral tablet: 1 tab(s) orally every 6 hours, As needed, Moderate Pain (4 - 6) MDD:4 tabs  senna oral tablet: 2 tab(s) orally once a day (at bedtime)  sulfamethoxazole-trimethoprim 800 mg-160 mg oral tablet: 1 tab(s) orally 2 times a day  tamsulosin 0.4 mg oral capsule: 1 cap(s) orally once a day (at bedtime)

## 2021-02-27 NOTE — DISCHARGE NOTE PROVIDER - HOSPITAL COURSE
66yo male pt with PMHx of HLD, Kidney stonex2 (last one was 7years ago) presents to ED with right flank pain and diagnosed right mid ureter stone (5mm) at an outside hospital 2 days ago.  He was sent home on Percocet and Ibuprofen but his pain was not controlled. He spoked to his urologist Dr. Hoenig and recommended to go to ED. Patient failed conservative mgmt in CDU overnight 2/2 pain ctrl however he wished to again be discharged to trial medical expulsive therapy     66yo male pt with PMHx of HLD, Kidney stonex2 (last one was 7years ago) presents to ED with right flank pain and diagnosed right mid ureter stone (5mm) at an outside hospital 2 days ago.  He was sent home on Percocet and Ibuprofen but his pain was not controlled. He spoked to his urologist Dr. Hoenig and recommended to go to ED. Patient failed conservative mgmt in CDU overnight 2/2 pain ctrl however he wished to again be discharged to trial medical expulsive therapy.

## 2021-02-27 NOTE — DISCHARGE NOTE PROVIDER - NSDCCPCAREPLAN_GEN_ALL_CORE_FT
PRINCIPAL DISCHARGE DIAGNOSIS  Diagnosis: Renal colic  Assessment and Plan of Treatment: You have a kidney stone.  Recommend:  -increase liquid intake to increase urine output, stay well hydrated  -pain control with tylenol for mild pain and oxycodone for more severe pain  -recommend colace/senna while taking narcotic pain medication to avoid constipation. Do not drive while taking narcotic pain medication   -take flomax nightly  -strain urine  -take antibiotic Bactrim twice a day as prescribed for 3 days  Follow up with Dr. Hoenig in the office in 2 weeks.

## 2021-02-27 NOTE — ED CDU PROVIDER SUBSEQUENT DAY NOTE - HISTORY
No interval change since initial CDU note. Pt resting comfortably, pain improved after toradol, morphine, and tylenol. VSS. - Luisa Benoit PA-C

## 2021-02-27 NOTE — ED CDU PROVIDER SUBSEQUENT DAY NOTE - PROGRESS NOTE DETAILS
Pt with return of pain. Plan to trial oral pain meds, will give oxycodone and re-eval. Most recent VSS. Urology aware of the patient with continued pain along with increased Cr. pt to remain NPO for further management by Uro. Per Urology, pt can do PO challenge and be discharge with PO meds with follow up with Dr. Hoenig in office this week. Pt moaning in pain after speaking to Urology 8/10 pain, unable to find a comfortable position. Urology repaged. Potential admission. Will treat pain now.

## 2021-02-27 NOTE — DISCHARGE NOTE NURSING/CASE MANAGEMENT/SOCIAL WORK - PATIENT PORTAL LINK FT
You can access the FollowMyHealth Patient Portal offered by [facility] by registering at the following website: [email or web address]. By joining [facility]’s FollowMyHealth portal, you will also be able to view your health information using other applications (apps) compatible with our system.

## 2021-02-27 NOTE — PROGRESS NOTE ADULT - ASSESSMENT
R renal colic secondary to 5mm R mid ureteral stone  - Pain control, i.e. Toradol, Percocet  - Send urine culture  - Strain urine  - Hydration  - Flomax 0.4mg QHS  - ok to feed & D/c follow outpt 1 week dr. Hoenig

## 2021-02-27 NOTE — PROGRESS NOTE ADULT - SUBJECTIVE AND OBJECTIVE BOX
Subjective  pt seen and examined. pain controlled since 4am, slight bump in SCr, no nausea, fevers, or chills    Objective    Vital signs  T(F): , Max: 99.2 (02-26-21 @ 11:49)  HR: 81 (02-27-21 @ 07:42)  BP: 156/74 (02-27-21 @ 07:42)  SpO2: 97% (02-27-21 @ 07:42)  Wt(kg): --    Output       Gen: NAD  Abd: SNN      Labs      02-27 @ 06:22    WBC --    / Hct --    / SCr 1.59     02-26 @ 13:32    WBC 9.98  / Hct 43.2  / SCr 1.26       Urine Cx: ?  Blood Cx: ?    Imaging

## 2021-02-27 NOTE — H&P ADULT - ASSESSMENT
65M with R 5mm midureteral stone failed conservative mgmt 2/2 pain, planned for OR Monday right URS    Plan:  -admit to urology  -am labs  -f/u UCx  -oob/ambi/DVTppx  -regular diet  -Bactrim  -IVF  -Plan for URS monday

## 2021-02-27 NOTE — ED CDU PROVIDER SUBSEQUENT DAY NOTE - ATTENDING CONTRIBUTION TO CARE
Attending MD Joy:   I personally have seen and examined this patient.  Physician assistant note reviewed and agree on plan of care and except where noted.  See below for details.     Seen in [place] 61    65M with PMH/PSH including HLD, nephrolithiasis (last one 7 yrs ago) presents to the ED with R flank pain and known nephrolithiasis.  Reports persistent R flank pain.  Denies fevers.  Denies chest pain, shortness of breath, palpitations. Denies dysuria, hematuria, change in urinary habits including frequency, urgency.  Denies numbness, weakness or tingling in extremities. Denies rash. Reports sexually active, one female partner, denies STIs.  Denies testicular pain, penile discharge or lesions.  A ten (10) point review of systems was negative other than as stated in the HPI or elsewhere in the chart.     Exam:   General: NAD  HENT: head NCAT, airway patent with moist mucous membranes  Eyes: PERRL  Lungs: lungs CTAB with good inspiratory effort, no wheezing, no rhonchi, no rales  Cardiac: +S1S2, no m/r/g  GI: abdomen soft with +BS, NT, ND  : R CVAT  MSK: FROM at neck, no calf tenderness, swelling, erythema or warmth  Neuro: moving all extremities spontaneously, sensory grossly intact, no gross neuro deficits  Psych: normal mood and affect     A/P: 65M with R nephrolithiasis, pain poorly controlled, Cr, uro will admit patient.

## 2021-02-27 NOTE — DISCHARGE NOTE PROVIDER - CARE PROVIDER_API CALL
Hoenig, David M (MD)  Urology  [facility]- Dept. of Urology, [address] [place] [address]  Phone: [phone]  Fax: [phone]  Follow Up Time:

## 2021-02-27 NOTE — H&P ADULT - HISTORY OF PRESENT ILLNESS
66yo male pt with PMHx of HLD, Kidney stonex2 (last one was 7years ago) presents to ED with right flank pain and diagnosed right mid ureter stone (5mm) at an outside hospital 2 days ago.  He was sent home on Percocet and Ibuprofen but his pain was not controlled. He spoked to his urologist Dr. Hoenig and recommended to go to ED. He noticed radiating pain to lower abdomen, nausea and chills with severe pain, pain was 5/10 on arrival to the ER and he feels it is worsening.  Denies fever, cough, congestion or sore throat. Denies CP/SOB. Denies hematuria, dysuria, urgency or frequency.   Patient failed conservative mgmt in CDU overnight 2/2 pain ctrl    HPI    PAST MEDICAL & SURGICAL HISTORY:  Renal stone    Back pain    Dyslipidemia    S/P foot surgery    S/P spinal fusion        MEDICATIONS  (STANDING):  ciprofloxacin   IVPB 400 milliGRAM(s) IV Intermittent every 12 hours  heparin   Injectable 5000 Unit(s) SubCutaneous every 8 hours  ketorolac   Injectable 15 milliGRAM(s) IV Push every 8 hours  lactated ringers. 1000 milliLiter(s) (125 mL/Hr) IV Continuous <Continuous>  polyethylene glycol 3350 17 Gram(s) Oral daily  senna 2 Tablet(s) Oral at bedtime  sodium chloride 0.9% lock flush 3 milliLiter(s) IV Push every 8 hours  sodium chloride 0.9%. 1000 milliLiter(s) (125 mL/Hr) IV Continuous <Continuous>  tamsulosin 0.4 milliGRAM(s) Oral at bedtime    MEDICATIONS  (PRN):  acetaminophen   Tablet .. 975 milliGRAM(s) Oral every 6 hours PRN Mild Pain (1 - 3)  HYDROmorphone  Injectable 1 milliGRAM(s) IV Push every 8 hours PRN break through pain  morphine  - Injectable 4 milliGRAM(s) IV Push every 6 hours PRN Severe Pain (7 - 10)  ondansetron Injectable 4 milliGRAM(s) IV Push every 6 hours PRN Nausea and/or Vomiting  oxyCODONE    IR 5 milliGRAM(s) Oral every 6 hours PRN Moderate Pain (4 - 6)      FAMILY HISTORY:      Allergies    penicillin (Unknown)    Intolerances        SOCIAL HISTORY:    REVIEW OF SYSTEMS: Otherwise negative as stated in HPI    Physical Exam  Vital signs  T(C): 36.8 (21 @ 07:42), Max: 37.3 (21 @ 11:49)  HR: 81 (21 @ 07:42)  BP: 156/74 (21 @ 07:42)  SpO2: 97% (21 @ 07:42)  Wt(kg): --    Output      Gen:  NAD    Pulm:  No respiratory distress  	  CV:  RRR    GI:  S/ND/NT    :      MSK:      LABS:       @ 06:22    WBC --    / Hct --    / SCr 1.59      @ 13:32    WBC 9.98  / Hct 43.2  / SCr 1.26         139  |  106  |  23  ----------------------------<  106<H>  4.4   |  26  |  1.59<H>    Ca    8.4      2021 06:22    TPro  7.0  /  Alb  4.2  /  TBili  1.0  /  DBili  x   /  AST  24  /  ALT  27  /  AlkPhos  37<L>        Urinalysis Basic - ( 2021 16:22 )    Color: Yellow / Appearance: Clear / S.020 / pH: x  Gluc: x / Ketone: Small  / Bili: Negative / Urobili: Negative   Blood: x / Protein: Trace / Nitrite: Negative   Leuk Esterase: Negative / RBC: 1 /hpf / WBC 3 /HPF   Sq Epi: x / Non Sq Epi: 1 /hpf / Bacteria: Negative        Urine Cx:  Blood Cx:    RADIOLOGY:

## 2021-03-01 ENCOUNTER — TRANSCRIPTION ENCOUNTER (OUTPATIENT)
Age: 66
End: 2021-03-01

## 2021-03-03 ENCOUNTER — TRANSCRIPTION ENCOUNTER (OUTPATIENT)
Age: 66
End: 2021-03-03

## 2021-03-05 ENCOUNTER — TRANSCRIPTION ENCOUNTER (OUTPATIENT)
Age: 66
End: 2021-03-05

## 2021-03-11 LAB — NIDUS STONE QN: NORMAL

## 2021-03-16 PROCEDURE — 99285 EMERGENCY DEPT VISIT HI MDM: CPT | Mod: 25

## 2021-03-16 PROCEDURE — 85025 COMPLETE CBC W/AUTO DIFF WBC: CPT

## 2021-03-16 PROCEDURE — U0003: CPT

## 2021-03-16 PROCEDURE — 81001 URINALYSIS AUTO W/SCOPE: CPT

## 2021-03-16 PROCEDURE — 80048 BASIC METABOLIC PNL TOTAL CA: CPT

## 2021-03-16 PROCEDURE — U0005: CPT

## 2021-03-16 PROCEDURE — 80053 COMPREHEN METABOLIC PANEL: CPT

## 2021-03-16 PROCEDURE — 96375 TX/PRO/DX INJ NEW DRUG ADDON: CPT

## 2021-03-16 PROCEDURE — 87086 URINE CULTURE/COLONY COUNT: CPT

## 2021-03-16 PROCEDURE — 96374 THER/PROPH/DIAG INJ IV PUSH: CPT

## 2021-03-31 ENCOUNTER — APPOINTMENT (OUTPATIENT)
Dept: UROLOGY | Facility: CLINIC | Age: 66
End: 2021-03-31

## 2021-08-11 ENCOUNTER — APPOINTMENT (OUTPATIENT)
Dept: UROLOGY | Facility: CLINIC | Age: 66
End: 2021-08-11

## 2021-09-29 ENCOUNTER — TRANSCRIPTION ENCOUNTER (OUTPATIENT)
Age: 66
End: 2021-09-29

## 2021-10-09 ENCOUNTER — TRANSCRIPTION ENCOUNTER (OUTPATIENT)
Age: 66
End: 2021-10-09

## 2022-04-12 ENCOUNTER — APPOINTMENT (OUTPATIENT)
Dept: ORTHOPEDIC SURGERY | Facility: CLINIC | Age: 67
End: 2022-04-12

## 2022-05-26 ENCOUNTER — APPOINTMENT (OUTPATIENT)
Dept: ORTHOPEDIC SURGERY | Facility: CLINIC | Age: 67
End: 2022-05-26
Payer: MEDICARE

## 2022-05-26 DIAGNOSIS — G56.02 CARPAL TUNNEL SYNDROME, LEFT UPPER LIMB: ICD-10-CM

## 2022-05-26 DIAGNOSIS — G56.22 LESION OF ULNAR NERVE, LEFT UPPER LIMB: ICD-10-CM

## 2022-05-26 DIAGNOSIS — G56.01 CARPAL TUNNEL SYNDROME, RIGHT UPPER LIMB: ICD-10-CM

## 2022-05-26 DIAGNOSIS — M77.01 MEDIAL EPICONDYLITIS, RIGHT ELBOW: ICD-10-CM

## 2022-05-26 DIAGNOSIS — M19.049 PRIMARY OSTEOARTHRITIS, UNSPECIFIED HAND: ICD-10-CM

## 2022-05-26 DIAGNOSIS — G56.21 LESION OF ULNAR NERVE, RIGHT UPPER LIMB: ICD-10-CM

## 2022-05-26 PROCEDURE — 73130 X-RAY EXAM OF HAND: CPT | Mod: 50

## 2022-05-26 PROCEDURE — 99214 OFFICE O/P EST MOD 30 MIN: CPT

## 2022-05-26 NOTE — HISTORY OF PRESENT ILLNESS
[4] : 4 [2] : 2 [Tingling] : tingling [de-identified] : 5/26/22: 67yo RHD M presents for RIGHT medial elbow pain, BILATERAL index finger DIPJ bump, and LEFT index finger stiffness. Also c/o tingling of BILATERAL hands (all digits, but worst to RF/SF) during sleep for a few months. Denies injury.\par Previously saw Dr. Perla for RIGHT medial epicondylitis => CSI 2/1/22 - provided 6wks of relief, PT. Pain aggravated with pickleball.\par \par Hx: asthma. kidney stones. [] : no [FreeTextEntry1] : nando index fingers/ right elbow  [FreeTextEntry5] : no known injury, pain started 6 months ago [FreeTextEntry6] : in the fingers  [de-identified] : 03/08/2022 [de-identified] :  [de-identified] : x-ray

## 2022-05-26 NOTE — IMAGING
[Bilateral] : hand bilaterally [de-identified] : LEFT HAND\par skin intact. no swelling.\par no TTP.\par good elbow extension, flexion. good pronation, supination.\par good wrist extension, flexion.\par good EPL, FPL. good finger extension, flex to full fist. good finger abduction and adduction. \par SILT median, ulnar, radial distributions.\par palpable radial pulse, brisk cap refill all digits.\par \par  5/5, 1st DI 5/5, APB 5/5.\par no triggering.\par negative Tinel's at carpal tunnel. negative Phalen's test.\par + ulnar nerve subluxation at the elbow. + Tinel's at cubital tunnel.\par negative Spurling's test.\par \par \par RIGHT ELBOW\par skin intact. no swelling.\par TTP to medial epicondyle.\par good elbow extension, flexion. good pronation, supination. \par good finger extension, flex to full fist.\par No ulnar nerve subluxation. Negative Tinel’s at cubital tunnel.\par \par RIGHT HAND\par skin intact. no swelling.\par no TTP.\par good elbow extension, flexion. good pronation, supination.\par good wrist extension, flexion.\par good EPL, FPL. good finger extension, flex to full fist. good finger abduction and adduction. \par SILT median, ulnar, radial distributions.\par palpable radial pulse, brisk cap refill all digits.\par \par  5/5, 1st DI 4/5, APB 5/5.\par no triggering.\par negative Tinel's at carpal tunnel. negative Phalen's test.\par negative Spurling's test. 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 no acute displaced fracture or dislocation.

## 2022-05-26 NOTE — ASSESSMENT
[FreeTextEntry1] : The condition was explained to the patient. \par \par Discussed that arthritis is a progressive degenerative process, and symptoms may have a waxing/waning course, which may be exacerbated by activity or trauma. Discussed treatment options - activity modification, bracing, steroid injection, or last resort surgery.\par \par Discussed that the natural history of epicondylitis is self-limited and most cases resolve by 1 year. Recommend symptomatic treatment in the interim - activity modification, ice, NSAID, brace, PT, steroid vs PRP injection, or surgery. Can consider surgery if conservative management fails and symptoms persist >1y.\par - recommend wrist brace - full time except hygiene x 2wks, then for lifting activity and sleep x 2wks, then PRN. \par - continue HEP for medial epicondylitis.\par - activity modification PRN.\par - recommend ice and OTC pain medications such as Tylenol and NSAIDs as needed, provided there are no contra-indicated medical conditions (eg liver disease, kidney disease, or GI ulcer/bleeding) or medications (eg blood thinners). Discussed possible GI and blood pressure side effects.\par \par - provided handout on activity/posture modification and night splint for CuTS.\par - recommend bilateral carpal tunnel night splint.\par \par Recommend f/u in 6wks to monitor CTS/CuTS symptoms, as neuropathy can progress and can cause permanent nerve damage.\par Patient declined to schedule f/u appointment, will f/u PRN.

## 2022-08-02 ENCOUNTER — FORM ENCOUNTER (OUTPATIENT)
Age: 67
End: 2022-08-02

## 2022-08-02 ENCOUNTER — APPOINTMENT (OUTPATIENT)
Dept: ORTHOPEDIC SURGERY | Facility: CLINIC | Age: 67
End: 2022-08-02

## 2022-08-02 VITALS — HEIGHT: 70 IN | BODY MASS INDEX: 28.63 KG/M2 | WEIGHT: 200 LBS

## 2022-08-02 PROCEDURE — 73564 X-RAY EXAM KNEE 4 OR MORE: CPT | Mod: LT

## 2022-08-02 PROCEDURE — 99203 OFFICE O/P NEW LOW 30 MIN: CPT

## 2022-08-02 RX ORDER — MELOXICAM 15 MG/1
15 TABLET ORAL
Qty: 30 | Refills: 1 | Status: ACTIVE | COMMUNITY
Start: 2022-08-02 | End: 1900-01-01

## 2022-08-02 NOTE — ASSESSMENT
[FreeTextEntry1] : XR with mild L knee DJD\par Recommend MRI L Knee to r/o MMT\par Mobic Rx\par FU for MRI review

## 2022-08-02 NOTE — HISTORY OF PRESENT ILLNESS
[Gradual] : gradual [Result of repetitive motion] : result of repetitive motion [5] : 5 [Dull/Aching] : dull/aching [Occasional] : occasional [Walking] : walking [Exercising] : exercising [Stairs] : stairs [de-identified] : 67M here with 4 weeks of left knee pain. He felt pain after playing pickleball one day. He has medial sided kneepain. Pain worse gonig down stairs. has not tried any pain medication. No prior L knee issues.  [] : no

## 2022-08-02 NOTE — IMAGING
[Left] : left knee [AP] : anteroposterior [Lateral] : lateral [[place]] : skyline [AP Standing] : anteroposterior standing [Degenerative change] : Degenerative change

## 2022-08-02 NOTE — PHYSICAL EXAM
[Left] : left knee [5___] : hamstring 5[unfilled]/5 [] : non-antalgic [TWNoteComboBox7] : flexion 120 degrees

## 2022-08-03 ENCOUNTER — APPOINTMENT (OUTPATIENT)
Dept: MRI IMAGING | Facility: CLINIC | Age: 67
End: 2022-08-03

## 2022-08-03 PROCEDURE — 73721 MRI JNT OF LWR EXTRE W/O DYE: CPT | Mod: LT,MH

## 2022-08-08 ENCOUNTER — APPOINTMENT (OUTPATIENT)
Dept: ORTHOPEDIC SURGERY | Facility: CLINIC | Age: 67
End: 2022-08-08

## 2022-08-08 VITALS — BODY MASS INDEX: 28.63 KG/M2 | HEIGHT: 70 IN | WEIGHT: 200 LBS

## 2022-08-08 PROCEDURE — 99213 OFFICE O/P EST LOW 20 MIN: CPT

## 2022-08-08 PROCEDURE — L1833: CPT

## 2022-08-08 NOTE — REASON FOR VISIT
[FreeTextEntry2] : 68yo male with left knee pain ongoing for a few months. States pain started after playing pickle ball. He saw Dr. Batista and has been taking Mobic with relief of pain. He had an MRI and is here for the results.

## 2022-08-08 NOTE — DISCUSSION/SUMMARY
[de-identified] : General Dx discussion\par The patient was advised of the diagnosis. The natural history of the pathology was explained in full to the patient in layman's terms. All questions were answered. The risks and benefits of surgical and non-surgical treatment alternatives were explained in full to the patient. \par \par Case discussed.\par MRI pictures and report reviewed.\par He currently does not have meniscal symptoms.\par Continue mobic as needed.\par Discussed the possibility of a cortisone injection in the future if pain worsens.\par Hinged knee brace.\par Rest 2 weeks.\par May resume activities in 2-3 weeks in brace.\par Followup in 1 month.\par \par Entered by JIM Fatima acting as scribe.\par \par -\par The documentation recorded by the scribe accurately reflects the service I personally performed and the decisions made by me.\par

## 2022-08-08 NOTE — DATA REVIEWED
[MRI] : MRI [Left] : left [Knee] : knee [Report was reviewed and noted in the chart] : The report was reviewed and noted in the chart [I reviewed the films/CD] : I reviewed the films/CD [FreeTextEntry1] : 1. Complex medial meniscal tearing with moderate stress reaction/subchondral edema in the adjacent medial  tibial plateau, slight with pes anserine tenosynovitis, bursitis, effusion, popliteal cyst, and soft tissue swelling.\par 2. Mild chronic ACL sprain.\par 3. Mild chronic MCL laxity.\par 4. Mild extensor mechanism tendinopathy.\par

## 2022-08-08 NOTE — HISTORY OF PRESENT ILLNESS
[Gradual] : gradual [Result of repetitive motion] : result of repetitive motion [0] : 0 [Dull/Aching] : dull/aching [Occasional] : occasional [Leisure] : leisure [Meds] : meds [Walking] : walking [Exercising] : exercising [Stairs] : stairs [de-identified] : 67M here with 4 weeks of left knee pain. He felt pain after playing pickleball one day. He has medial sided kneepain. Pain worse gonig down stairs. has not tried any pain medication. No prior L knee issues.  [] : no [FreeTextEntry5] : Patient is here today for MRI results of left knee. Patient states that the pain has improved since last visit. Pt states that the Mobic he is on has been helpful for pain.   [de-identified] : 8/2/22 [de-identified] : Dr. Batista [de-identified] : MRI [de-identified] : MRI

## 2022-08-08 NOTE — PHYSICAL EXAM
[Left] : left knee [5___] : hamstring 5[unfilled]/5 [Equivocal] : equivocal Darrell [] : non-antalgic [TWNoteComboBox7] : flexion 125 degrees [de-identified] : extension 0 degrees

## 2022-09-02 ENCOUNTER — APPOINTMENT (OUTPATIENT)
Dept: ORTHOPEDIC SURGERY | Facility: CLINIC | Age: 67
End: 2022-09-02

## 2022-09-02 VITALS — HEIGHT: 70 IN | BODY MASS INDEX: 28.63 KG/M2 | WEIGHT: 200 LBS

## 2022-09-02 DIAGNOSIS — S83.412D SPRAIN OF MEDIAL COLLATERAL LIGAMENT OF LEFT KNEE, SUBSEQUENT ENCOUNTER: ICD-10-CM

## 2022-09-02 PROCEDURE — 99213 OFFICE O/P EST LOW 20 MIN: CPT

## 2022-09-02 NOTE — REASON FOR VISIT
[FreeTextEntry2] : follow up/ left knee \par reports some increased pain after he stopped taking mobic

## 2022-09-02 NOTE — PHYSICAL EXAM
[Left] : left knee [5___] : hamstring 5[unfilled]/5 [Equivocal] : equivocal Darrell [] : non-antalgic [TWNoteComboBox7] : flexion 120 degrees [de-identified] : extension 0 degrees

## 2022-09-02 NOTE — DISCUSSION/SUMMARY
[de-identified] : General Dx discussion\par The patient was advised of the diagnosis. The natural history of the pathology was explained in full to the patient in layman's terms. All questions were answered. The risks and benefits of surgical and non-surgical treatment alternatives were explained in full to the patient. \par \par will cont brace f/u 6 weeks

## 2022-09-02 NOTE — HISTORY OF PRESENT ILLNESS
[0] : 0 [FreeTextEntry1] : left knee  [FreeTextEntry5] :  RONALD SCHOEN is a 67 year male who is here today for left knee pain. States he cannot tell if there has been any improvement because he is in the brace.  [de-identified] : none

## 2022-09-22 NOTE — ED PROVIDER NOTE - CARE PLAN
Pt requesting more refills of gabapentin with med change. To clarify pt's new dosage would be. Is this correct and okay to refill? Last refills of gabapentin below    0300 200 MG gabapentin   1000 500 MG   1400 500 MG  Bedtime 900 MG    Pt also requesting to increase cymbalta in the morning to 30 MG. Current dose is 20 MG in the morning and 30 MG at bedtime. Please advise             gabapentin (NEURONTIN) 300 MG capsule 09/06/2022 30 120 capsule Jairo Simeon DO [id number] ...   gabapentin (NEURONTIN) 300 MG capsule 07/19/2022 30 120 capsule Jairo Simeon DO [id number] ...   gabapentin (NEURONTIN) 100 MG capsule 07/18/2022 90 270 capsule Jairo Simeon DO [id number] ...                Principal Discharge DX:	Kidney stone on right side

## 2022-10-13 ENCOUNTER — APPOINTMENT (OUTPATIENT)
Dept: ORTHOPEDIC SURGERY | Facility: CLINIC | Age: 67
End: 2022-10-13

## 2022-10-13 VITALS — WEIGHT: 200 LBS | HEIGHT: 70 IN | BODY MASS INDEX: 28.63 KG/M2

## 2022-10-13 VITALS — WEIGHT: 200 LBS | BODY MASS INDEX: 28.63 KG/M2 | HEIGHT: 70 IN

## 2022-10-13 DIAGNOSIS — M23.92 UNSPECIFIED INTERNAL DERANGEMENT OF LEFT KNEE: ICD-10-CM

## 2022-10-13 DIAGNOSIS — S83.232D COMPLEX TEAR OF MEDIAL MENISCUS, CURRENT INJURY, LEFT KNEE, SUBSEQUENT ENCOUNTER: ICD-10-CM

## 2022-10-13 PROCEDURE — 20611 DRAIN/INJ JOINT/BURSA W/US: CPT | Mod: LT

## 2022-10-13 PROCEDURE — 99213 OFFICE O/P EST LOW 20 MIN: CPT | Mod: 25

## 2022-10-13 PROCEDURE — J3490M: CUSTOM

## 2022-10-13 NOTE — PROCEDURE
[Large Joint Injection] : Large joint injection [Left] : of the left [Knee] : knee [Pain] : pain [Inflammation] : inflammation [Alcohol] : alcohol [Betadine] : betadine [Ethyl Chloride sprayed topically] : ethyl chloride sprayed topically [Sterile technique used] : sterile technique used [___ cc    3mg] :  Betamethasone (Celestone) ~Vcc of 3mg [___ cc    1%] : Lidocaine ~Vcc of 1%  [___ cc    0.25%] : Bupivacaine (Marcaine) ~Vcc of 0.25%  [] : Patient tolerated procedure well [Call if redness, pain or fever occur] : call if redness, pain or fever occur [Apply ice for 15min out of every hour for the next 12-24 hours as tolerated] : apply ice for 15 minutes out of every hour for the next 12-24 hours as tolerated [Previous OTC use and PT nontherapeutic] : patient has tried OTC's including aspirin, Ibuprofen, Aleve, etc or prescription NSAIDS, and/or exercises at home and/or physical therapy without satisfactory response [Patient had decreased mobility in the joint] : patient had decreased mobility in the joint [Risks, benefits, alternatives discussed / Verbal consent obtained] : the risks benefits, and alternatives have been discussed, and verbal consent was obtained [Prior failure or difficult injection] : prior failure or difficult injection [All ultrasound images have been permanently captured and stored accordingly in our picture archiving and communication system] : All ultrasound images have been permanently captured and stored accordingly in our picture archiving and communication system

## 2022-10-13 NOTE — DISCUSSION/SUMMARY
[de-identified] : General Dx discussion\par The patient was advised of the diagnosis. The natural history of the pathology was explained in full to the patient in layman's terms. All questions were answered. The risks and benefits of surgical and non-surgical treatment alternatives were explained in full to the patient. \par \par Case Discussed.\par CSI today and tolerated well. \par Ice and Brace as needed. \par Mobic as needed.\par Surg/Nonsurg options discussed.\par Risk/Benefits discussed.\par Advised he may need arthroscopic surgery if symptoms persist. \par \par \par Entered by Nikki FERNANDEZ acting as a scribe.\par Instructions: Dr. Whyte- The documentation recorded by the scribe accurately reflects the service I personally performed and the decisions made by me.\par

## 2022-10-13 NOTE — HISTORY OF PRESENT ILLNESS
[Sharp] : sharp [Occasional] : occasional [Household chores] : household chores [Leisure] : leisure [Retired] : Work status: retired [0] : 0 [de-identified] : Patient is here for a follow up on his left knee. He felt a twinge while playing pickle ball about 2 weeks ago. Wearing brace. He went for therapy with some relief. He continues to have pain when he squats. Pain with gardening.  [] : no [FreeTextEntry1] : left knee  [FreeTextEntry5] :  RONALD SCHOEN is a 67 year male who is here today for left knee pain. States he cannot tell if there has been any improvement because he is in the brace.  [FreeTextEntry9] : NIDHI [de-identified] : bending [de-identified] : none

## 2022-10-13 NOTE — PHYSICAL EXAM
[Left] : left knee [5___] : hamstring 5[unfilled]/5 [Equivocal] : equivocal Darrell [] : non-antalgic [TWNoteComboBox7] : flexion 120 degrees [de-identified] : extension 0 degrees

## 2022-12-16 ENCOUNTER — NON-APPOINTMENT (OUTPATIENT)
Age: 67
End: 2022-12-16

## 2023-03-24 NOTE — ED ADULT NURSE NOTE - PAIN: PRESENCE, MLM
[Average] : average [Cooperative] : cooperative [Euthymic] : euthymic [Full] : full [Clear] : clear [Linear/Goal Directed] : linear/goal directed complains of pain/discomfort [WNL] : within normal limits [Not applicable] : not applicable

## 2023-09-13 NOTE — DISCHARGE NOTE PROVIDER - NS AS DC PROVIDER CONTACT Y/N MULTI
Daily Note     Today's date: 2023  Patient name: Meghana Pandey  : 1989  MRN: [id number]  Referring provider: Self, Referral  Dx:   Encounter Diagnosis     ICD-10-CM    1. Pain in lateral portion of left knee  M25.562                      Subjective: Pt reports calf soreness after last visit, felt like intense workout. Feels he is improving overall. Objective: requires cueing w/ single leg squat, kick stand squat to perform w/ proper glute drive and knee mechanics on L - corrects despite fatigue. Assessment: Tolerated treatment well. Patient demonstrated fatigue post treatment and would benefit from continued PT. Does well w/ exercise progressions today. ROM approaching normal limits, functional strength improving as well. Will benefit from more aggressive challenges barring setback. Focus on single leg strength in prep for dynamic activities. Plan: Continue per plan of care. retro walking, QING split squat work, hip flexor work     POC expires Auth Status Total   Visits  Start date  Expiration date PT/OT + Visit Limit?  Co-Insurance    Not req     No                                         Dummy Auth Tracking  1 2 3 4 5 6   7 8 9 10 11 12   13 14 15 16 17 18   19 20 21 22 23 24   25 26 27 28 29 30   31 32 33 34 35 36         Precautions: standard     Date (Visit #)  IE (1)  (2)  (3) (4) (5)   Manual              DTM and TPR                                                                        Exercise Diary         Ther Ex        Active w/u    upright post x 7 min lvl 4-5  upright pre 7 min lvl        PROM  tested B   x2-3 mins    IASTM to L prox calf x5 mins  x2-3 mins    x5-6 mins       HS/glute/piri stretch  tested   x3-4 min L HS and calf  x2-3 mins        QS, SLR, hip abd  QS x10  SAQ x10  SLR attempted   reg SLR x10, cw/ccw SLR and hip abd 2x10 each   rev       Active mobility                                                Neuro Re Ed        Bridging Reg x10, w/ ad sq x10, marching x10 attempted   rev  x20 w/ march       TrA progressions            Squat training Sit<>stand elevated surface x5-10 total  reg 2x10 throughout, L HE x10-15    QING SS 2x10 B  2x10 reg    Lateral kossack TRX x20    Kickstand or single leg 2x10 B       Hip Abd Progressions S/l clams x 10, w/ hip IR x10  rev  rev       Balance   Ant tib raises 2x15  Heel raises reg and soleus 2x10    Singles intro x20      x20 each             HEP and POC review  x8-10 mins total x2-3 mins  x2-3 mins        HE ant taps x20         Retro walking CC 22. 5# x10 total          HF work       Single leg burners x10 reg, w/ mini squat 2x10 B     Ther Act             stairs             gait             Sit<>stand                                                                                                                  Modalities             heat             Ice Yes

## 2023-11-29 NOTE — ED CDU PROVIDER INITIAL DAY NOTE - PMH
----- Message from Lauro Awad sent at 11/29/2023  1:06 PM CST -----  Contact: Self  Type:  RX Refill Request    Who Called:  PT  Refill or New Rx:  Refill    RX Name and Strength:    tirzepatide (MOUNJARO) 2.5 mg/0.5 mL PnIj    How is the patient currently taking it? (ex. 1XDay):    Is this a 30 day or 90 day RX:    Preferred Pharmacy with phone number:        [facility] - [address] [facility]  [address] [place] [address]  Phone: [phone] Fax: [phone]    Would like a call back as well.      Local or Mail Order:    Ordering Provider:    Best Call Back Number:  [phone]    Additional Information:        
Back pain    Dyslipidemia    Renal stone

## 2024-07-25 ENCOUNTER — APPOINTMENT (OUTPATIENT)
Dept: MRI IMAGING | Facility: CLINIC | Age: 69
End: 2024-07-25
Payer: MEDICARE

## 2024-07-25 PROCEDURE — 74183 MRI ABD W/O CNTR FLWD CNTR: CPT | Mod: 26,MH

## 2025-05-06 ENCOUNTER — APPOINTMENT (OUTPATIENT)
Dept: ORTHOPEDIC SURGERY | Facility: CLINIC | Age: 70
End: 2025-05-06
Payer: MEDICARE

## 2025-05-06 DIAGNOSIS — M77.11 LATERAL EPICONDYLITIS, RIGHT ELBOW: ICD-10-CM

## 2025-05-06 DIAGNOSIS — G56.21 LESION OF ULNAR NERVE, RIGHT UPPER LIMB: ICD-10-CM

## 2025-05-06 DIAGNOSIS — M25.521 PAIN IN RIGHT ELBOW: ICD-10-CM

## 2025-05-06 PROCEDURE — 99213 OFFICE O/P EST LOW 20 MIN: CPT

## 2025-05-06 PROCEDURE — 73080 X-RAY EXAM OF ELBOW: CPT | Mod: RT

## 2025-05-06 RX ORDER — TRIAMCINOLONE ACETONIDE 55 MCG
AEROSOL WITH ADAPTER (GRAM) NASAL
Refills: 0 | Status: ACTIVE | COMMUNITY

## 2025-05-06 RX ORDER — SEMAGLUTIDE 1.34 MG/ML
INJECTION, SOLUTION SUBCUTANEOUS
Refills: 0 | Status: ACTIVE | COMMUNITY

## 2025-06-17 ENCOUNTER — APPOINTMENT (OUTPATIENT)
Dept: ORTHOPEDIC SURGERY | Facility: CLINIC | Age: 70
End: 2025-06-17
Payer: MEDICARE

## 2025-06-17 PROCEDURE — 99213 OFFICE O/P EST LOW 20 MIN: CPT

## 2025-06-26 ENCOUNTER — APPOINTMENT (OUTPATIENT)
Dept: ORTHOPEDIC SURGERY | Facility: CLINIC | Age: 70
End: 2025-06-26
Payer: MEDICARE

## 2025-06-26 PROBLEM — M19.042 OSTEOARTHRITIS OF FINGER OF LEFT HAND: Status: ACTIVE | Noted: 2025-06-26

## 2025-06-26 PROCEDURE — 99213 OFFICE O/P EST LOW 20 MIN: CPT

## 2025-06-26 PROCEDURE — 73140 X-RAY EXAM OF FINGER(S): CPT | Mod: LT

## 2025-06-26 RX ORDER — DICLOFENAC SODIUM 10 MG/G
1 GEL TOPICAL DAILY
Qty: 1 | Refills: 3 | Status: ACTIVE | COMMUNITY
Start: 2025-06-26 | End: 1900-01-01

## 2025-07-22 ENCOUNTER — OUTPATIENT (OUTPATIENT)
Dept: OUTPATIENT SERVICES | Facility: HOSPITAL | Age: 70
LOS: 1 days | End: 2025-07-22
Payer: MEDICARE

## 2025-07-22 ENCOUNTER — APPOINTMENT (OUTPATIENT)
Dept: MRI IMAGING | Facility: CLINIC | Age: 70
End: 2025-07-22
Payer: MEDICARE

## 2025-07-22 DIAGNOSIS — Z00.00 ENCOUNTER FOR GENERAL ADULT MEDICAL EXAMINATION WITHOUT ABNORMAL FINDINGS: ICD-10-CM

## 2025-07-22 PROCEDURE — A9585: CPT

## 2025-07-22 PROCEDURE — 74183 MRI ABD W/O CNTR FLWD CNTR: CPT | Mod: MH

## 2025-07-22 PROCEDURE — 74183 MRI ABD W/O CNTR FLWD CNTR: CPT | Mod: 26
